# Patient Record
Sex: MALE | Race: WHITE | NOT HISPANIC OR LATINO | Employment: FULL TIME | URBAN - NONMETROPOLITAN AREA
[De-identification: names, ages, dates, MRNs, and addresses within clinical notes are randomized per-mention and may not be internally consistent; named-entity substitution may affect disease eponyms.]

---

## 2023-06-28 LAB
ALANINE AMINOTRANSFERASE (SGPT) (U/L) IN SER/PLAS: 41 U/L (ref 10–52)
ALBUMIN (G/DL) IN SER/PLAS: 4.2 G/DL (ref 3.4–5)
ALKALINE PHOSPHATASE (U/L) IN SER/PLAS: 69 U/L (ref 33–120)
ALPHA-1 FETOPROTEIN (NG/ML) IN SER/PLAS: <4 NG/ML (ref 0–9)
AMYLASE (U/L) IN SER/PLAS: 34 U/L (ref 29–103)
ANION GAP IN SER/PLAS: 11 MMOL/L (ref 10–20)
ASPARTATE AMINOTRANSFERASE (SGOT) (U/L) IN SER/PLAS: 27 U/L (ref 9–39)
BILIRUBIN TOTAL (MG/DL) IN SER/PLAS: 0.6 MG/DL (ref 0–1.2)
CALCIUM (MG/DL) IN SER/PLAS: 9 MG/DL (ref 8.6–10.3)
CANCER AG 19-9 (U/ML) IN SER/PLAS: <4 U/ML
CANCER AG 27-29 (U/ML) IN SER/PLAS: 27.7 U/ML (ref 0–38.6)
CARBON DIOXIDE, TOTAL (MMOL/L) IN SER/PLAS: 25 MMOL/L (ref 21–32)
CARCINOEMBRYONIC AG (NG/ML) IN SER/PLAS: <0.5 UG/L
CHLORIDE (MMOL/L) IN SER/PLAS: 108 MMOL/L (ref 98–107)
CHOLESTEROL (MG/DL) IN SER/PLAS: 170 MG/DL (ref 0–199)
CHOLESTEROL IN HDL (MG/DL) IN SER/PLAS: 45 MG/DL
CHOLESTEROL/HDL RATIO: 3.8
CREATININE (MG/DL) IN SER/PLAS: 0.88 MG/DL (ref 0.5–1.3)
ERYTHROCYTE DISTRIBUTION WIDTH (RATIO) BY AUTOMATED COUNT: 12.9 % (ref 11.5–14.5)
ERYTHROCYTE MEAN CORPUSCULAR HEMOGLOBIN CONCENTRATION (G/DL) BY AUTOMATED: 34.4 G/DL (ref 32–36)
ERYTHROCYTE MEAN CORPUSCULAR VOLUME (FL) BY AUTOMATED COUNT: 86 FL (ref 80–100)
ERYTHROCYTES (10*6/UL) IN BLOOD BY AUTOMATED COUNT: 4.92 X10E12/L (ref 4.5–5.9)
ESTIMATED AVERAGE GLUCOSE FOR HBA1C: 91 MG/DL
GFR MALE: >90 ML/MIN/1.73M2
GLUCOSE (MG/DL) IN SER/PLAS: 98 MG/DL (ref 74–99)
HEMATOCRIT (%) IN BLOOD BY AUTOMATED COUNT: 42.5 % (ref 41–52)
HEMOGLOBIN (G/DL) IN BLOOD: 14.6 G/DL (ref 13.5–17.5)
HEMOGLOBIN A1C/HEMOGLOBIN TOTAL IN BLOOD: 4.8 %
LDL: 109 MG/DL (ref 0–99)
LEUKOCYTES (10*3/UL) IN BLOOD BY AUTOMATED COUNT: 4.5 X10E9/L (ref 4.4–11.3)
LIPASE (U/L) IN SER/PLAS: 16 U/L (ref 9–82)
PLATELETS (10*3/UL) IN BLOOD AUTOMATED COUNT: 170 X10E9/L (ref 150–450)
POTASSIUM (MMOL/L) IN SER/PLAS: 3.8 MMOL/L (ref 3.5–5.3)
PROTEIN TOTAL: 7.1 G/DL (ref 6.4–8.2)
SODIUM (MMOL/L) IN SER/PLAS: 140 MMOL/L (ref 136–145)
TESTOSTERONE (NG/DL) IN SER/PLAS: 368 NG/DL (ref 240–1000)
THYROTROPIN (MIU/L) IN SER/PLAS BY DETECTION LIMIT <= 0.05 MIU/L: 1.48 MIU/L (ref 0.44–3.98)
TRIGLYCERIDE (MG/DL) IN SER/PLAS: 78 MG/DL (ref 0–149)
UREA NITROGEN (MG/DL) IN SER/PLAS: 12 MG/DL (ref 6–23)
VLDL: 16 MG/DL (ref 0–40)

## 2023-07-03 LAB
APPEARANCE, URINE: CLEAR
BILIRUBIN, URINE: NEGATIVE
BLOOD, URINE: NEGATIVE
COLOR, URINE: COLORLESS
GLUCOSE, URINE: NEGATIVE MG/DL
KETONES, URINE: NEGATIVE MG/DL
LEUKOCYTE ESTERASE, URINE: NEGATIVE
NITRITE, URINE: NEGATIVE
PH, URINE: 7 (ref 5–8)
PROTEIN, URINE: NEGATIVE MG/DL
SPECIFIC GRAVITY, URINE: 1 (ref 1–1.03)
UROBILINOGEN, URINE: <2 MG/DL (ref 0–1.9)

## 2023-07-18 ENCOUNTER — OFFICE VISIT (OUTPATIENT)
Dept: PRIMARY CARE | Facility: CLINIC | Age: 28
End: 2023-07-18
Payer: OTHER GOVERNMENT

## 2023-07-18 VITALS
HEART RATE: 67 BPM | HEIGHT: 72 IN | OXYGEN SATURATION: 96 % | WEIGHT: 237.9 LBS | DIASTOLIC BLOOD PRESSURE: 90 MMHG | SYSTOLIC BLOOD PRESSURE: 130 MMHG | BODY MASS INDEX: 32.22 KG/M2

## 2023-07-18 DIAGNOSIS — R06.83 SNORING: ICD-10-CM

## 2023-07-18 DIAGNOSIS — G47.30 OBSERVED SLEEP APNEA: ICD-10-CM

## 2023-07-18 DIAGNOSIS — Z76.89 ENCOUNTER TO ESTABLISH CARE: Primary | ICD-10-CM

## 2023-07-18 PROBLEM — K42.9 UMBILICAL HERNIA: Status: ACTIVE | Noted: 2020-10-22

## 2023-07-18 PROBLEM — F40.243 ANXIETY WITH FLYING: Status: ACTIVE | Noted: 2022-05-17

## 2023-07-18 PROCEDURE — 1036F TOBACCO NON-USER: CPT | Performed by: STUDENT IN AN ORGANIZED HEALTH CARE EDUCATION/TRAINING PROGRAM

## 2023-07-18 PROCEDURE — 99204 OFFICE O/P NEW MOD 45 MIN: CPT | Performed by: STUDENT IN AN ORGANIZED HEALTH CARE EDUCATION/TRAINING PROGRAM

## 2023-07-18 RX ORDER — MULTIVITAMIN
1 TABLET ORAL
COMMUNITY

## 2023-07-18 ASSESSMENT — PATIENT HEALTH QUESTIONNAIRE - PHQ9
1. LITTLE INTEREST OR PLEASURE IN DOING THINGS: NOT AT ALL
SUM OF ALL RESPONSES TO PHQ9 QUESTIONS 1 AND 2: 0
2. FEELING DOWN, DEPRESSED OR HOPELESS: NOT AT ALL

## 2023-07-18 NOTE — PROGRESS NOTES
Subjective   Patient ID: Jonatan Horn is a 28 y.o. male who presents for New PT  (Would like to establish care as well as get a sleep study done. Never had a sleep study done, wife is concerned about breathing during sleep. Denies trouble falling asleep, but does wake up a lot through out the night. ).    HPI    Here to establish care and has sleep concerns.     Umbilical hernia operated about 18 months ago. Not bothering him at this time.     Works for the fire department.     Sleep Medicine Consultation Note:    Snoring: yes  Severity: loud  Frequency: daily  Duration: ~10 years   Over time: worsening   Observed Apneas: yes  Mouth Breathing at night: yes  Dry Mouth in morning: yes  Nocturnal Gasping: yes  Nasal Obstruction: no  Weight: gained 25 lbs over the last three years.     Sleep Pattern:  Bed/Recliner/Wedge: flat bed.   # of pillows under head: 1-2  Position: all sides.  Bedtime: 11:30 pm  Latency: short latency  Awakenings: 2-3 times  Reason: trouble breathing  Wake time: 9- 9:30 am  Patients estimate of total sleep time: 9 to 9.5 hours     Questionnaires:  Patient-reported scores:    Daytime Symptoms:  Upon Awakening: groggy  Daytime fatigue/sleepiness: yes  Naps: yes - 1 hour 4-5 times a week  Involuntary Dozing: sometimes   Driving: Difficulty with sleepiness and driving: on long   Close calls related to sleepiness: no  Accidents related to sleepiness: no    Sleep Review of Symptoms:  Parasomnias:  Sleep Walking: no  Dream Enactment: no  Bruxism: not sure    Motor:  RLS: no  PLMS: no    Narcolepsy:  Hallucinations: no  Paralysis: no  Cataplexy: not discussed    Past/Childhood Sleep History: none     Family History: Family history of sleep disorders: mother- sleepwalks.     Social History:  Employment: works in fire department.   Alcohol: occasional   Smoking: never  Other drugs: no  Caffeine: 3-4 cups of coffee daily  Family: lives with family - 2 dogs. One dog sleeps in bed.     ROS:  CON: weight  "change: see HPI  ENT: nasal obstruction: see HPI  NEURO: sleep related headaches: sometimes - but wonders if this is from neck pain.  GI: GERD: once in a while  : Nocturia: sometimes.  ALL: Environmental Allergies: lately yes    PE:  General: calm and cooperative  Eyes: Conjunctival injection: no  EOM: intact  Eyelid hooding: no    ENT: MP: 2/4  Facial deformity: mild retrognathia.   Hard palate: normal  Soft palate: mild uvular inflammation  Gums and teeth: normal  Tongue: lateral scalloping.   Nares: turbinate inflammation  Pul: Respirations: even, non labored  Auscultation: CTA bilaterally  Neck/Lymphatics: Lymphadenopathy: no  Masses: no  Circumference: 17 inches  CV: RRR, no murmurs, no lower extremity swelling  Neuro: no tics or tremors noted. Gait stable  Psych  Alert and appropriate: yes  Mood: \"good\"  Affect: mood congruent    Assessment: Mr. Horn who is seen to evaluate for possible obstructive sleep apnea. The pathophysiology of, the reasons to treat and treatment options for obstructive sleep apnea were all reviewed with the patient today. Has risk factors (neck circumference >16; BMI 32 and reported symptoms). Will try HSAT. Understands that he may need in-lab if this comes back negative.     Recommendations:  1) Polysomnography - HSAT.  2) Driving safety was reviewed with patient. If the patient feels too sleepy to drive he/she knows not to drive. If he/she becomes sleepy while driving he/she will pull over and nap.    The patient indicates understanding of these issues and agrees with the plan.    Follow up in 4-6 weeks.     Nicko Andrade MD MPH  "

## 2023-07-27 DIAGNOSIS — G47.30 OBSERVED SLEEP APNEA: ICD-10-CM

## 2023-07-27 DIAGNOSIS — R06.83 SNORING: ICD-10-CM

## 2023-08-21 ENCOUNTER — OFFICE VISIT (OUTPATIENT)
Dept: PRIMARY CARE | Facility: CLINIC | Age: 28
End: 2023-08-21
Payer: OTHER GOVERNMENT

## 2023-08-21 VITALS
WEIGHT: 239 LBS | OXYGEN SATURATION: 95 % | BODY MASS INDEX: 32.37 KG/M2 | HEART RATE: 70 BPM | SYSTOLIC BLOOD PRESSURE: 128 MMHG | DIASTOLIC BLOOD PRESSURE: 88 MMHG

## 2023-08-21 DIAGNOSIS — G47.30 OBSERVED SLEEP APNEA: Primary | ICD-10-CM

## 2023-08-21 DIAGNOSIS — R06.83 SNORING: ICD-10-CM

## 2023-08-21 PROCEDURE — 99213 OFFICE O/P EST LOW 20 MIN: CPT | Performed by: STUDENT IN AN ORGANIZED HEALTH CARE EDUCATION/TRAINING PROGRAM

## 2023-08-21 PROCEDURE — 1036F TOBACCO NON-USER: CPT | Performed by: STUDENT IN AN ORGANIZED HEALTH CARE EDUCATION/TRAINING PROGRAM

## 2023-08-21 NOTE — PROGRESS NOTES
"Subjective   Patient ID: Jonatan Horn is a 28 y.o. male who presents for Follow-up (PT is here for 4-6 week. PT had a sleep study done and would like to go over results of that. ).    HPI    Patient is here to discuss HSAT test results.   Reports that he continues to have symptoms of daytime sleepiness. His wife is also significantly concerned about his KERVIN as she notices apneic events.     \"Sleep Medicine Consultation Note:     Snoring: yes  Severity: loud  Frequency: daily  Duration: ~10 years   Over time: worsening   Observed Apneas: yes  Mouth Breathing at night: yes  Dry Mouth in morning: yes  Nocturnal Gasping: yes  Nasal Obstruction: no  Weight: gained 25 lbs over the last three years.      Sleep Pattern:  Bed/Recliner/Wedge: flat bed.   # of pillows under head: 1-2  Position: all sides.  Bedtime: 11:30 pm  Latency: short latency  Awakenings: 2-3 times  Reason: trouble breathing  Wake time: 9- 9:30 am  Patients estimate of total sleep time: 9 to 9.5 hours      Questionnaires:  Patient-reported scores:     Daytime Symptoms:  Upon Awakening: groggy  Daytime fatigue/sleepiness: yes  Naps: yes - 1 hour 4-5 times a week  Involuntary Dozing: sometimes   Driving: Difficulty with sleepiness and driving: on long   Close calls related to sleepiness: no  Accidents related to sleepiness: no     Sleep Review of Symptoms:  Parasomnias:  Sleep Walking: no  Dream Enactment: no  Bruxism: not sure     Motor:  RLS: no  PLMS: no     Narcolepsy:  Hallucinations: no  Paralysis: no  Cataplexy: not discussed     Past/Childhood Sleep History: none      Family History: Family history of sleep disorders: mother- sleepwalks.      Social History:  Employment: works in fire department.   Alcohol: occasional   Smoking: never  Other drugs: no  Caffeine: 3-4 cups of coffee daily  Family: lives with family - 2 dogs. One dog sleeps in bed.      ROS:  CON: weight change: see HPI  ENT: nasal obstruction: see HPI  NEURO: sleep related " "headaches: sometimes - but wonders if this is from neck pain.  GI: GERD: once in a while  : Nocturia: sometimes.  ALL: Environmental Allergies: lately yes     PE:  General: calm and cooperative  Eyes: Conjunctival injection: no  EOM: intact  Eyelid hooding: no     ENT: MP: 2/4  Facial deformity: mild retrognathia.   Hard palate: normal  Soft palate: mild uvular inflammation  Gums and teeth: normal  Tongue: lateral scalloping.   Nares: turbinate inflammation  Pul: Respirations: even, non labored  Auscultation: CTA bilaterally  Neck/Lymphatics: Lymphadenopathy: no  Masses: no  Circumference: 17 inches  CV: RRR, no murmurs, no lower extremity swelling  Neuro: no tics or tremors noted. Gait stable  Psych  Alert and appropriate: yes  Mood: \"good\"  Affect: mood congruent     Assessment: Mr. Horn who is seen to evaluate for possible obstructive sleep apnea. The pathophysiology of, the reasons to treat and treatment options for obstructive sleep apnea were all reviewed with the patient today. Has risk factors (neck circumference >16; BMI 32 and reported symptoms). Will try HSAT. Understands that he may need in-lab if this comes back negative. \"      Review of Systems  ROS negative except discussed above in HPI.    Vitals:    08/21/23 0911   BP: 128/88   Pulse: 70   SpO2: 95%     Objective   Physical Exam  Constitutional:       Appearance: Normal appearance.   Cardiovascular:      Rate and Rhythm: Normal rate and regular rhythm.   Pulmonary:      Effort: Pulmonary effort is normal.      Breath sounds: Normal breath sounds.   Neurological:      Mental Status: He is alert.       Assessment/Plan   Jonatan was seen today for follow-up.    Discussed results with patient. HSAT did not show evidence of KERVIN based on ovarall RDI. However, supine RDI was high. Patient reports significant symptoms associated with KERVIN(see above). An in-lab PSG is ordered for further evaluation as HSAT can miss the diagnosis of KERVIN due to its " potential to miss diagnosis based on arousal criteria as it does not monitor for eeg. Patient wants to get further evaluated given his symptoms. Ordered in-lab PSG..    Diagnoses and all orders for this visit:  Observed sleep apnea (Primary)  -     In-Center Sleep Study (Non-Sleep Provider Only)  Snoring  -     In-Center Sleep Study (Non-Sleep Provider Only)      Follow up in 6-8 weeks.     Nicko Andrade MD MPH

## 2023-10-02 ENCOUNTER — APPOINTMENT (OUTPATIENT)
Dept: PRIMARY CARE | Facility: CLINIC | Age: 28
End: 2023-10-02
Payer: OTHER GOVERNMENT

## 2023-10-14 PROBLEM — N50.819 TESTICLE TENDERNESS: Status: ACTIVE | Noted: 2023-10-14

## 2023-10-14 PROBLEM — E66.811 CLASS 1 OBESITY WITH ALVEOLAR HYPOVENTILATION AND BODY MASS INDEX (BMI) OF 32.0 TO 32.9 IN ADULT: Status: ACTIVE | Noted: 2023-10-14

## 2023-10-14 PROBLEM — E66.9 CLASS 1 OBESITY WITH BODY MASS INDEX (BMI) OF 32.0 TO 32.9 IN ADULT: Status: ACTIVE | Noted: 2023-10-14

## 2023-10-14 PROBLEM — E66.2 CLASS 1 OBESITY WITH ALVEOLAR HYPOVENTILATION AND BODY MASS INDEX (BMI) OF 32.0 TO 32.9 IN ADULT (MULTI): Status: ACTIVE | Noted: 2023-10-14

## 2023-10-14 PROBLEM — E66.811 CLASS 1 OBESITY WITH ALVEOLAR HYPOVENTILATION AND BODY MASS INDEX (BMI) OF 32.0 TO 32.9 IN ADULT: Status: RESOLVED | Noted: 2023-10-14 | Resolved: 2023-10-14

## 2023-10-14 PROBLEM — E66.2 CLASS 1 OBESITY WITH ALVEOLAR HYPOVENTILATION AND BODY MASS INDEX (BMI) OF 32.0 TO 32.9 IN ADULT (MULTI): Status: RESOLVED | Noted: 2023-10-14 | Resolved: 2023-10-14

## 2023-10-14 PROBLEM — M25.562 LEFT KNEE PAIN: Status: ACTIVE | Noted: 2023-10-14

## 2023-10-14 RX ORDER — FLURBIPROFEN 100 MG/1
100 TABLET, FILM COATED ORAL 2 TIMES DAILY
COMMUNITY

## 2023-10-14 RX ORDER — HYDROXYZINE HYDROCHLORIDE 10 MG/1
1 TABLET, FILM COATED ORAL AS NEEDED
COMMUNITY

## 2023-10-16 ENCOUNTER — CLINICAL SUPPORT (OUTPATIENT)
Dept: SLEEP MEDICINE | Facility: CLINIC | Age: 28
End: 2023-10-16
Payer: OTHER GOVERNMENT

## 2023-10-16 ENCOUNTER — TELEPHONE (OUTPATIENT)
Dept: SLEEP MEDICINE | Facility: CLINIC | Age: 28
End: 2023-10-16
Payer: OTHER GOVERNMENT

## 2023-10-16 DIAGNOSIS — G47.30 OBSERVED SLEEP APNEA: ICD-10-CM

## 2023-10-16 DIAGNOSIS — R06.83 SNORING: ICD-10-CM

## 2023-10-16 DIAGNOSIS — G47.33 OBSTRUCTIVE SLEEP APNEA (ADULT) (PEDIATRIC): ICD-10-CM

## 2023-10-16 PROCEDURE — 95810 POLYSOM 6/> YRS 4/> PARAM: CPT | Performed by: STUDENT IN AN ORGANIZED HEALTH CARE EDUCATION/TRAINING PROGRAM

## 2023-10-16 ASSESSMENT — SLEEP AND FATIGUE QUESTIONNAIRES: ESS TOTAL SCORE: 10

## 2023-10-17 VITALS
HEART RATE: 60 BPM | WEIGHT: 239 LBS | TEMPERATURE: 98 F | SYSTOLIC BLOOD PRESSURE: 139 MMHG | DIASTOLIC BLOOD PRESSURE: 94 MMHG | BODY MASS INDEX: 32.37 KG/M2 | RESPIRATION RATE: 16 BRPM | HEIGHT: 72 IN

## 2023-10-17 ASSESSMENT — SLEEP AND FATIGUE QUESTIONNAIRES
HOW LIKELY ARE YOU TO NOD OFF OR FALL ASLEEP WHILE LYING DOWN TO REST IN THE AFTERNOON WHEN CIRCUMSTANCES PERMIT: HIGH CHANCE OF DOZING
SITING INACTIVE IN A PUBLIC PLACE LIKE A CLASS ROOM OR A MOVIE THEATER: MODERATE CHANCE OF DOZING
HOW LIKELY ARE YOU TO NOD OFF OR FALL ASLEEP WHILE SITTING QUIETLY AFTER LUNCH WITHOUT ALCOHOL: SLIGHT CHANCE OF DOZING
HOW LIKELY ARE YOU TO NOD OFF OR FALL ASLEEP IN A CAR, WHILE STOPPED FOR A FEW MINUTES IN TRAFFIC: WOULD NEVER DOZE
HOW LIKELY ARE YOU TO NOD OFF OR FALL ASLEEP WHILE SITTING AND TALKING TO SOMEONE: WOULD NEVER DOZE
ESS-CHAD TOTAL SCORE: 10
HOW LIKELY ARE YOU TO NOD OFF OR FALL ASLEEP WHILE SITTING AND READING: MODERATE CHANCE OF DOZING
HOW LIKELY ARE YOU TO NOD OFF OR FALL ASLEEP WHILE WATCHING TV: SLIGHT CHANCE OF DOZING
HOW LIKELY ARE YOU TO NOD OFF OR FALL ASLEEP WHEN YOU ARE A PASSENGER IN A CAR FOR AN HOUR WITHOUT A BREAK: SLIGHT CHANCE OF DOZING

## 2023-10-17 NOTE — PROGRESS NOTES
[facility] TECH NOTE:     Patient: Jonatan Horn   MRN//AGE: [id number]  1995  28 y.o.   Technologist: FRANCISCO Perkins    Room: 3   Service Date: 10/17/2023        Sleep Testing Location: [place] [address]     [place]: 10    TECHNOLOGIST SLEEP STUDY PROCEDURE NOTE:   This sleep study is being conducted according to the policies and procedures outlined by the AAS accreditation standards.  The sleep study procedure and processes involved during this appointment was explained to the patient. All questions were answered. The patient verbalized understanding.      The patient is a 28 y.o. year old male scheduled for a PSG  with montage of:  PSG . he arrived for his appointment as scheduled.     The study that was ultimately completed was a PSG  with montage of:  PSG .    The full study Was completed.  Patient questionnaires completed?: yes     Consents signed? yes    Initial Fall Risk Screening:     Jonatan has not fallen in the last 6 months. Jonatan does not have a fear of falling. He does not need assistance with sitting, standing, or walking. he does not need assistance walking in his home. he does not need assistance in an unfamiliar setting. The patient is notusing an assistive device.     Brief Study observations: Mr Horn arrived as scheduled for his polysomnogram. He was escorted to room 3 where consent was signed and all questions were answered. During this study, he experienced occasional obstructive respiratory events (mostly Hypopneas) with associated arousals. Snoring was frequent and loud.     Deviation to order/protocol and reason: None          After the procedure, the patient was reminded to followup with ordering clinician for testing results.      Technologist: FRANCISCO Perkins

## 2023-11-27 ENCOUNTER — OFFICE VISIT (OUTPATIENT)
Dept: PRIMARY CARE | Facility: CLINIC | Age: 28
End: 2023-11-27
Payer: OTHER GOVERNMENT

## 2023-11-27 ENCOUNTER — LAB (OUTPATIENT)
Dept: LAB | Facility: LAB | Age: 28
End: 2023-11-27
Payer: OTHER GOVERNMENT

## 2023-11-27 VITALS
SYSTOLIC BLOOD PRESSURE: 136 MMHG | HEART RATE: 76 BPM | WEIGHT: 246.7 LBS | BODY MASS INDEX: 33.41 KG/M2 | DIASTOLIC BLOOD PRESSURE: 90 MMHG | OXYGEN SATURATION: 97 %

## 2023-11-27 DIAGNOSIS — G25.81 RESTLESS LEG SYNDROME: ICD-10-CM

## 2023-11-27 DIAGNOSIS — G47.30 OBSERVED SLEEP APNEA: Primary | ICD-10-CM

## 2023-11-27 LAB — FERRITIN SERPL-MCNC: 79 NG/ML (ref 20–300)

## 2023-11-27 PROCEDURE — 82728 ASSAY OF FERRITIN: CPT

## 2023-11-27 PROCEDURE — 99213 OFFICE O/P EST LOW 20 MIN: CPT | Performed by: STUDENT IN AN ORGANIZED HEALTH CARE EDUCATION/TRAINING PROGRAM

## 2023-11-27 PROCEDURE — 1036F TOBACCO NON-USER: CPT | Performed by: STUDENT IN AN ORGANIZED HEALTH CARE EDUCATION/TRAINING PROGRAM

## 2023-11-27 PROCEDURE — 36415 COLL VENOUS BLD VENIPUNCTURE: CPT

## 2023-11-27 ASSESSMENT — PATIENT HEALTH QUESTIONNAIRE - PHQ9
1. LITTLE INTEREST OR PLEASURE IN DOING THINGS: NOT AT ALL
2. FEELING DOWN, DEPRESSED OR HOPELESS: NOT AT ALL
SUM OF ALL RESPONSES TO PHQ9 QUESTIONS 1 AND 2: 0

## 2023-11-27 NOTE — PROGRESS NOTES
Subjective   Patient ID: Jonatan Horn is a 28 y.o. male who presents for Follow-up.    HPI    Patient is here with his wife to go over sleep study results.  Denies trouble falling asleep. Reports he does wake up but can go back to sleep pretty easy. Reports he does snore. Reports he sometimes wakes up gasping and feeling like he can't breathe.    Review of Systems  ROS negative except discussed above in HPI.    Vitals:    11/27/23 1323   BP: 136/90   Pulse: 76   SpO2: 97%     Objective   Physical Exam  Constitutional:       Appearance: Normal appearance.   Neurological:      Mental Status: He is alert.           Assessment/Plan   Jonatan was seen today for follow-up.  Diagnoses and all orders for this visit:  Observed sleep apnea (Primary)  -     Positive Airway Pressure (PAP) Therapy  Restless leg syndrome  -     Ferritin; Future    Discussed sleep study results which showed mild KERVIN. But given the patient's daytime sleepiness and snoring, will start CPAP.     Few PLMs noticed -- so will check ferritin levels.     Follow up in 6-8 weeks.          Nicko Andrade MD MPH

## 2024-02-28 ENCOUNTER — APPOINTMENT (OUTPATIENT)
Dept: PRIMARY CARE | Facility: CLINIC | Age: 29
End: 2024-02-28

## 2024-09-24 ENCOUNTER — LAB (OUTPATIENT)
Dept: LAB | Facility: LAB | Age: 29
End: 2024-09-24

## 2024-09-24 ENCOUNTER — HOSPITAL ENCOUNTER (OUTPATIENT)
Dept: RADIOLOGY | Facility: HOSPITAL | Age: 29
Discharge: HOME | End: 2024-09-24

## 2024-09-24 DIAGNOSIS — Z00.01 ENCOUNTER FOR GENERAL ADULT MEDICAL EXAMINATION WITH ABNORMAL FINDINGS: ICD-10-CM

## 2024-09-24 DIAGNOSIS — Z00.01 ENCOUNTER FOR GENERAL ADULT MEDICAL EXAMINATION WITH ABNORMAL FINDINGS: Primary | ICD-10-CM

## 2024-09-24 LAB
ALBUMIN SERPL BCP-MCNC: 4.5 G/DL (ref 3.4–5)
ALP SERPL-CCNC: 71 U/L (ref 33–120)
ALT SERPL W P-5'-P-CCNC: 31 U/L (ref 10–52)
ANION GAP SERPL CALC-SCNC: 12 MMOL/L (ref 10–20)
AST SERPL W P-5'-P-CCNC: 20 U/L (ref 9–39)
BASOPHILS # BLD AUTO: 0.01 X10*3/UL (ref 0–0.1)
BASOPHILS NFR BLD AUTO: 0.2 %
BILIRUB SERPL-MCNC: 0.5 MG/DL (ref 0–1.2)
BUN SERPL-MCNC: 13 MG/DL (ref 6–23)
CALCIUM SERPL-MCNC: 9 MG/DL (ref 8.6–10.3)
CHLORIDE SERPL-SCNC: 105 MMOL/L (ref 98–107)
CHOLEST SERPL-MCNC: 175 MG/DL (ref 0–199)
CHOLESTEROL/HDL RATIO: 3.6
CO2 SERPL-SCNC: 25 MMOL/L (ref 21–32)
CREAT SERPL-MCNC: 0.87 MG/DL (ref 0.5–1.3)
EGFRCR SERPLBLD CKD-EPI 2021: >90 ML/MIN/1.73M*2
EOSINOPHIL # BLD AUTO: 0.08 X10*3/UL (ref 0–0.7)
EOSINOPHIL NFR BLD AUTO: 1.9 %
ERYTHROCYTE [DISTWIDTH] IN BLOOD BY AUTOMATED COUNT: 12.5 % (ref 11.5–14.5)
EST. AVERAGE GLUCOSE BLD GHB EST-MCNC: 85 MG/DL
GLUCOSE SERPL-MCNC: 93 MG/DL (ref 74–99)
HBA1C MFR BLD: 4.6 %
HCT VFR BLD AUTO: 41.5 % (ref 41–52)
HDLC SERPL-MCNC: 49 MG/DL
HGB BLD-MCNC: 14.6 G/DL (ref 13.5–17.5)
IMM GRANULOCYTES # BLD AUTO: 0.01 X10*3/UL (ref 0–0.7)
IMM GRANULOCYTES NFR BLD AUTO: 0.2 % (ref 0–0.9)
LDLC SERPL CALC-MCNC: 107 MG/DL
LYMPHOCYTES # BLD AUTO: 1.59 X10*3/UL (ref 1.2–4.8)
LYMPHOCYTES NFR BLD AUTO: 37.1 %
MCH RBC QN AUTO: 30.2 PG (ref 26–34)
MCHC RBC AUTO-ENTMCNC: 35.2 G/DL (ref 32–36)
MCV RBC AUTO: 86 FL (ref 80–100)
MONOCYTES # BLD AUTO: 0.34 X10*3/UL (ref 0.1–1)
MONOCYTES NFR BLD AUTO: 7.9 %
NEUTROPHILS # BLD AUTO: 2.25 X10*3/UL (ref 1.2–7.7)
NEUTROPHILS NFR BLD AUTO: 52.7 %
NON HDL CHOLESTEROL: 126 MG/DL (ref 0–149)
NRBC BLD-RTO: 0 /100 WBCS (ref 0–0)
PLATELET # BLD AUTO: 165 X10*3/UL (ref 150–450)
POTASSIUM SERPL-SCNC: 4 MMOL/L (ref 3.5–5.3)
PROT SERPL-MCNC: 7 G/DL (ref 6.4–8.2)
RBC # BLD AUTO: 4.84 X10*6/UL (ref 4.5–5.9)
SODIUM SERPL-SCNC: 138 MMOL/L (ref 136–145)
TRIGL SERPL-MCNC: 94 MG/DL (ref 0–149)
VLDL: 19 MG/DL (ref 0–40)
WBC # BLD AUTO: 4.3 X10*3/UL (ref 4.4–11.3)

## 2024-09-24 PROCEDURE — 71046 X-RAY EXAM CHEST 2 VIEWS: CPT | Performed by: RADIOLOGY

## 2024-09-24 PROCEDURE — 85025 COMPLETE CBC W/AUTO DIFF WBC: CPT

## 2024-09-24 PROCEDURE — 83036 HEMOGLOBIN GLYCOSYLATED A1C: CPT

## 2024-09-24 PROCEDURE — 80061 LIPID PANEL: CPT

## 2024-09-24 PROCEDURE — 80053 COMPREHEN METABOLIC PANEL: CPT

## 2024-09-24 PROCEDURE — 71046 X-RAY EXAM CHEST 2 VIEWS: CPT

## 2024-10-15 ENCOUNTER — HOSPITAL ENCOUNTER (OUTPATIENT)
Dept: CARDIOLOGY | Facility: HOSPITAL | Age: 29
Discharge: HOME | End: 2024-10-15

## 2024-10-15 DIAGNOSIS — Z00.01 ENCOUNTER FOR GENERAL ADULT MEDICAL EXAMINATION WITH ABNORMAL FINDINGS: ICD-10-CM

## 2024-10-15 LAB
ATRIAL RATE: 68 BPM
P AXIS: 13 DEGREES
P OFFSET: 180 MS
P ONSET: 119 MS
PR INTERVAL: 194 MS
Q ONSET: 216 MS
QRS COUNT: 11 BEATS
QRS DURATION: 108 MS
QT INTERVAL: 386 MS
QTC CALCULATION(BAZETT): 410 MS
QTC FREDERICIA: 402 MS
R AXIS: 40 DEGREES
T AXIS: 21 DEGREES
T OFFSET: 409 MS
VENTRICULAR RATE: 68 BPM

## 2024-10-15 PROCEDURE — 93005 ELECTROCARDIOGRAM TRACING: CPT

## 2025-02-13 ENCOUNTER — APPOINTMENT (OUTPATIENT)
Dept: PRIMARY CARE | Facility: CLINIC | Age: 30
End: 2025-02-13

## 2025-02-13 VITALS
DIASTOLIC BLOOD PRESSURE: 88 MMHG | OXYGEN SATURATION: 96 % | HEIGHT: 72 IN | HEART RATE: 81 BPM | BODY MASS INDEX: 33.72 KG/M2 | SYSTOLIC BLOOD PRESSURE: 138 MMHG | WEIGHT: 249 LBS

## 2025-02-13 DIAGNOSIS — M25.562 CHRONIC PAIN OF LEFT KNEE: Primary | ICD-10-CM

## 2025-02-13 DIAGNOSIS — G89.29 CHRONIC PAIN OF LEFT KNEE: Primary | ICD-10-CM

## 2025-02-13 DIAGNOSIS — N46.9 MALE INFERTILITY, UNSPECIFIED: ICD-10-CM

## 2025-02-13 DIAGNOSIS — Z00.00 ROUTINE GENERAL MEDICAL EXAMINATION AT A HEALTH CARE FACILITY: ICD-10-CM

## 2025-02-13 DIAGNOSIS — M54.2 NECK PAIN: ICD-10-CM

## 2025-02-13 ASSESSMENT — ENCOUNTER SYMPTOMS
FATIGUE: 0
POLYDIPSIA: 0
TROUBLE SWALLOWING: 0
CONSTIPATION: 0
POLYPHAGIA: 0
DIARRHEA: 0
RECTAL PAIN: 0
ABDOMINAL PAIN: 0
HEADACHES: 0
DIAPHORESIS: 0
WEAKNESS: 0
WOUND: 0
NECK STIFFNESS: 1
NERVOUS/ANXIOUS: 0
PALPITATIONS: 0
NECK PAIN: 1
CHILLS: 0
ARTHRALGIAS: 1
FREQUENCY: 0
SHORTNESS OF BREATH: 0
DIFFICULTY URINATING: 0
UNEXPECTED WEIGHT CHANGE: 0
MYALGIAS: 0
CHEST TIGHTNESS: 0
NUMBNESS: 0
NAUSEA: 0

## 2025-02-13 ASSESSMENT — PATIENT HEALTH QUESTIONNAIRE - PHQ9
SUM OF ALL RESPONSES TO PHQ9 QUESTIONS 1 AND 2: 0
2. FEELING DOWN, DEPRESSED OR HOPELESS: NOT AT ALL
1. LITTLE INTEREST OR PLEASURE IN DOING THINGS: NOT AT ALL

## 2025-02-13 NOTE — PROGRESS NOTES
Subjective   Patient ID: Jonatan Horn is a 29 y.o. male who presents for [facility] Care (Pt is here today to establish care as old PCP left, also reports neck pain and some other things he would like to talk about. ).    Patient presents to establish primary care.  Was previously by Dr. Watts in this office.    Routine physical performed by city, labs drawn by city and on record from September.    Left knee pain: Has had a reduction of function of the left knee over the last year, previously had an MRI performed 6 months ago that showed no ligament damage, no Baker's cyst, no effusion.  Patient has no pain on exam.  States the knee will give out on him and at times.  Discussed physical therapy with patient which she is open to.  Will perform 6 weeks of physical therapy and follow-up for improvement.    Reproductive issues: Patient wife been trying to conceive a child for 2-1/2 years, his wife is recently checked by OB/GYN and found to have no reproductive issues.  He would like to be referred for evaluation.  Will refer to Dr. Rg -urology.    Cervical strain: Pain to right cervical neck, has been treated by chiropractic.  Negative Spurling sign, no radiation.  Pain is persistent and patient would like further evaluation before any other treatment.  Will get x-ray cervical spine for evaluation.  Denies injury.         Review of Systems   Constitutional:  Negative for chills, diaphoresis, fatigue and unexpected weight change.   HENT:  Negative for dental problem, tinnitus and trouble swallowing.    Eyes:  Negative for visual disturbance.   Respiratory:  Negative for chest tightness and shortness of breath.    Cardiovascular:  Negative for chest pain, palpitations and leg swelling.   Gastrointestinal:  Negative for abdominal pain, constipation, diarrhea, nausea and rectal pain.   Endocrine: Negative for polydipsia, polyphagia and polyuria.   Genitourinary:  Negative for difficulty urinating, frequency and  "urgency.   Musculoskeletal:  Positive for arthralgias (left Knee), neck pain and neck stiffness. Negative for myalgias.   Skin:  Negative for pallor and wound.   Neurological:  Negative for syncope, weakness, numbness and headaches.   Psychiatric/Behavioral:  Negative for self-injury and suicidal ideas. The patient is not nervous/anxious.        Objective   /88   Pulse 81   Ht 1.83 m (6' 0.05\")   Wt 113 kg (249 lb)   SpO2 96%   BMI 33.72 kg/m²     Physical Exam  Vitals and nursing note reviewed.   Constitutional:       General: He is not in acute distress.     Appearance: Normal appearance. He is normal weight.   HENT:      Head: Normocephalic.      Nose: Nose normal. No congestion or rhinorrhea.      Mouth/Throat:      Mouth: Mucous membranes are moist.      Pharynx: Oropharynx is clear.   Eyes:      General: No scleral icterus.     Pupils: Pupils are equal, round, and reactive to light.   Neck:      Vascular: No carotid bruit.   Cardiovascular:      Rate and Rhythm: Normal rate and regular rhythm.      Pulses: Normal pulses.      Heart sounds: Normal heart sounds. No murmur heard.  Pulmonary:      Effort: Pulmonary effort is normal. No respiratory distress.      Breath sounds: Normal breath sounds. No stridor. No wheezing, rhonchi or rales.   Abdominal:      General: Bowel sounds are normal. There is no distension.      Palpations: Abdomen is soft.   Musculoskeletal:         General: No swelling, tenderness, deformity or signs of injury. Normal range of motion.      Cervical back: Normal range of motion. Rigidity present.   Skin:     General: Skin is warm and dry.      Capillary Refill: Capillary refill takes less than 2 seconds.      Coloration: Skin is not jaundiced.   Neurological:      General: No focal deficit present.      Mental Status: He is alert and oriented to person, place, and time. Mental status is at baseline.   Psychiatric:         Mood and Affect: Mood normal.         Behavior: Behavior " normal.         Thought Content: Thought content normal.         Judgment: Judgment normal.         Assessment/Plan   Diagnoses and all orders for this visit:  Chronic pain of left knee  -     Referral to Physical Therapy; Future  Neck pain  -     XR cervical spine 2-3 views; Future  Male infertility, unspecified  -     Referral to Urology; Future  Routine general medical examination at a health care facility

## 2025-02-14 ENCOUNTER — HOSPITAL ENCOUNTER (OUTPATIENT)
Dept: RADIOLOGY | Facility: CLINIC | Age: 30
Discharge: HOME | End: 2025-02-14
Payer: OTHER GOVERNMENT

## 2025-02-14 DIAGNOSIS — M54.2 NECK PAIN: ICD-10-CM

## 2025-02-14 PROBLEM — N46.9 MALE INFERTILITY, UNSPECIFIED: Status: ACTIVE | Noted: 2025-02-14

## 2025-02-14 PROCEDURE — 72040 X-RAY EXAM NECK SPINE 2-3 VW: CPT

## 2025-02-19 ENCOUNTER — EVALUATION (OUTPATIENT)
Dept: PHYSICAL THERAPY | Facility: CLINIC | Age: 30
End: 2025-02-19
Payer: OTHER GOVERNMENT

## 2025-02-19 DIAGNOSIS — G89.29 CHRONIC PAIN OF LEFT KNEE: ICD-10-CM

## 2025-02-19 DIAGNOSIS — M25.562 CHRONIC PAIN OF LEFT KNEE: ICD-10-CM

## 2025-02-19 PROCEDURE — 97161 PT EVAL LOW COMPLEX 20 MIN: CPT | Mod: GP

## 2025-02-19 PROCEDURE — 97110 THERAPEUTIC EXERCISES: CPT | Mod: GP

## 2025-02-19 ASSESSMENT — ENCOUNTER SYMPTOMS
OCCASIONAL FEELINGS OF UNSTEADINESS: 0
DEPRESSION: 0
LOSS OF SENSATION IN FEET: 0

## 2025-02-19 ASSESSMENT — PAIN - FUNCTIONAL ASSESSMENT: PAIN_FUNCTIONAL_ASSESSMENT: 0-10

## 2025-02-19 ASSESSMENT — PAIN SCALES - GENERAL: PAINLEVEL_OUTOF10: 4

## 2025-02-19 ASSESSMENT — ACTIVITIES OF DAILY LIVING (ADL): EFFECT OF PAIN ON DAILY ACTIVITIES: SEE GOALS

## 2025-02-19 NOTE — PROGRESS NOTES
Patient Name: Jonatan Horn  MRN: [id number]  Today's Date: 2025  : 1995  Omar Montana  Time Calculation  Start Time: 842  Stop Time: 915  Time Calculation (min): 33 min    PT Evaluation Time Entry  PT Evaluation (Low) Time Entry: 25   PT Therapeutic Procedures Time Entry  Therapeutic Exercise Time Entry: 8                         Assessment:  Rehab Prognosis: Fair (chronic syndrome)  Barriers to Participation:  (none)    Plan:  Treatment/Interventions: Aquatic therapy, Cryotherapy, Dry needling, Education/ Instruction, Electrical stimulation, Gait training, Hot pack, Manual therapy, Self care/ home management, Therapeutic exercises (IASTM/cupping)  PT Plan: Skilled PT  PT Frequency: 2 times per week  Duration: 4wks  Plan of Care Agreement: Patient    Current Problem:   1. Chronic pain of left knee  Referral to Physical Therapy    Follow Up In Physical Therapy          Subjective    General:  General  Reason for Referral: Chronic L knee pain  Referred By: Omar Montana  General Comment:   C/C sx*/Max sx level*:  BAILEY/DOI/Work*?:  noted pain and swelling at the knee after a run (jump and land) a few years ago)  ADL makes worse*?:Wbing; stairs and ladders  ADL makes better?: NWBing  PLOF:Unlimited job/home tasks performed- YES:   (see goals)  Testing*: 6/10/24 MR shows trochlear area degen    Physical Findings*: Limited:                                                  Strength ( L knee )                                         POC:  Ongoing clinic PT to include: continue with open to closed chain LLE PRE as jean-claude;  the patient does have access to bike  and free weight gym equip (will tailor ongoing HEP to available equip);  he was advised to not emphasize running primarily due to long term degenerative consequences    Other: (copay*?-no  ) (fall risk*?- no  ) (ins visit limit*?- no  )     Precautions:  Precautions  STEADI Fall Risk Score (The score of 4 or more indicates an increased risk of  "falling): 0  Precautions Comment: none    Pain:  Pain Assessment  Pain Assessment: 0-10  0-10 (Numeric) Pain Score: 4 (max sx)  Pain Location: Knee  Pain Orientation: Left  Effect of Pain on Daily Activities: see goals    Prior Level of Function:  Prior Function Per Pt/Caregiver Report  Vocational: Full time employment (fire dept)    Objective     Outcome Measures:  Other Measures  Lower Extremity Funtional Score (LEFS): 70     Treatments:  SLS on airex 2x20\"  POC:  Ongoing clinic PT to include: continue with open to closed chain LLE PRE as jean-claude;  the patient does have access to bike  and free weight gym equip (will tailor ongoing HEP to available equip);  he was advised to not emphasize running primarily due to long term degenerative consequences  OP EDUCATION:  Access Code: [email or web address]  URL: [email or web address]  Date: 02/19/2025  Prepared by: Mook Herzog    Exercises  - Standing Single Leg Stance with Counter Support (Mirrored)     Goals:  Active       PT Problem       PT Goal 1       Start:  02/19/25    Expected End:  05/20/25       1. Independent HEP to allow for 50% reduction in max ADL C/C sx ( 4/10) 2-3wks  2. Survey score improvement from 70/80 to 75/80 (LEFS) 3-4wks  3. .Strength increase to allow for improved ADL stairs/ladders (from 4+/5 to 5/5 L knee flex/ext) 3-4wks           PT Goal 2       Start:  02/19/25    Expected End:  05/20/25       1. \"I want my  knee  to feel better\".              KNEE                    Knee AROM WFL unless documented below  Knee AROM WFL: yes  Lower Extremity Strength:WFL unless documented  MMT 5/5 max  RIGHT LEFT   Hip Flexion     5/5     5/5   Hip Extension     5/5     5/5   Hip Abduction     5/5    5 /5        Knee Extension    5 /5    4+ /5   Knee Flexion    5 /5    4+ /5                                    "

## 2025-02-21 ENCOUNTER — APPOINTMENT (OUTPATIENT)
Dept: PRIMARY CARE | Facility: CLINIC | Age: 30
End: 2025-02-21
Payer: OTHER GOVERNMENT

## 2025-02-25 ENCOUNTER — TREATMENT (OUTPATIENT)
Dept: PHYSICAL THERAPY | Facility: CLINIC | Age: 30
End: 2025-02-25
Payer: OTHER GOVERNMENT

## 2025-02-25 DIAGNOSIS — M25.562 CHRONIC PAIN OF LEFT KNEE: ICD-10-CM

## 2025-02-25 DIAGNOSIS — G89.29 CHRONIC PAIN OF LEFT KNEE: ICD-10-CM

## 2025-02-25 PROCEDURE — 97110 THERAPEUTIC EXERCISES: CPT | Mod: GP,CQ

## 2025-02-25 ASSESSMENT — PAIN SCALES - GENERAL: PAINLEVEL_OUTOF10: 1

## 2025-02-25 ASSESSMENT — PAIN - FUNCTIONAL ASSESSMENT: PAIN_FUNCTIONAL_ASSESSMENT: 0-10

## 2025-02-25 NOTE — PROGRESS NOTES
"Physical Therapy Treatment    Patient Name: Jonatan Horn  MRN: [id number]  Today's Date: 2/25/2025  Time Calculation  Start Time: 0826  Stop Time: 0905  Time Calculation (min): 39 min  PT Therapeutic Procedures Time Entry  Therapeutic Exercise Time Entry: 38         Current Problem  Problem List Items Addressed This Visit             ICD-10-CM    Left knee pain M25.562       Subjective   Pt.'s name and birthday confirmed. Pt. Has no c/o sx.'s at rest.  Increased discomfort noted with climbing stairs.        Precautions  Precautions  Precautions Comment: none      Pain  Pain Assessment: 0-10  0-10 (Numeric) Pain Score: 1  Pain Location: Knee  Pain Orientation: Left    Objective:  Treatments:  Stepper lv 1 x5 mins (N)  Slant board x2  60\" ea (N)  Step ups 6\" 2 x10 L (N)  Seated LAQ's 2 x10 (N)  Seated HS curls green band 2 x10 (N)  Seated ball squeezes 2 x10 3\" hold (N)  Seated hip abduction mint green band 2 x10 (N)  Supine SLR's L 2 x10 (N)  S/L hip abd L 2 x10 (N)  Supine quad sets L 2 x10 (N)  SLS on airex 3 x25\"    Mini squats (A)  BOSU lunges (A)        Ongoing clinic PT to include: continue with open to closed chain LLE PRE as jean-claude;       OP EDUCATION:  Access Code: [email or web address]  URL: [email or web address]  Date: 02/25/2025  Prepared by: Joan Leigh    Exercises  - Step Up  - 1 x daily - 7 x weekly - 2 sets - 10 reps  - Slant Board Gastrocnemius Stretch  - 1 x daily - 7 x weekly - 1 sets - 2 reps - 60\" hold  - Seated Long Arc Quad  - 1 x daily - 7 x weekly - 2 sets - 10 reps  - Seated Hamstring Curl with Anchored Resistance  - 1 x daily - 7 x weekly - 2 sets - 10 reps  - Seated Hip Adduction Isometrics with Ball  - 1 x daily - 7 x weekly - 2 sets - 10 reps  - Seated Hip Abduction with Resistance  - 1 x daily - 7 x weekly - 2 sets - 10 reps  - Supine Straight Leg Raises  - 1 x daily - 7 x weekly - 2 sets - 10 reps  - Sidelying Hip Abduction  - 1 x daily - 7 x weekly - 2 sets - 10 reps  - " "Supine Quad Set  - 1 x daily - 7 x weekly - 2 sets - 10 reps      Access Code: [email or web address]  URL: [email or web address]  Date: 02/19/2025  Prepared by: Mook Herzog     Exercises  - Standing Single Leg Stance with Counter Support (Mirrored)            Assessment:  Good tolerance noted with TE with no c/o increased sx's. Noted.  Pt. Does have a spot of pain/discomfort with his knee when he squats down at a certain point.  Fair balance reactions noted with SLS.  Handout provided and reviewed with patient.         Plan:  Continue with strengthening, ROM and flexibility per tolerance to improve climbing stairs/work duties with little to no difficulty.  MB       OP PT Plan  Treatment/Interventions: Aquatic therapy, Cryotherapy, Dry needling, Education/ Instruction, Electrical stimulation, Gait training, Hot pack, Manual therapy, Self care/ home management, Therapeutic exercises (IASTM/cupping)  PT Plan: Skilled PT  PT Frequency: 2 times per week  Duration: 4wks  Plan of Care Agreement: Patient              Goals:  Active       PT Problem       PT Goal 1       Start:  02/19/25    Expected End:  05/20/25       1. Independent HEP to allow for 50% reduction in max ADL C/C sx ( 4/10) 2-3wks  2. Survey score improvement from 70/80 to 75/80 (LEFS) 3-4wks  3. .Strength increase to allow for improved ADL stairs/ladders (from 4+/5 to 5/5 L knee flex/ext) 3-4wks           PT Goal 2       Start:  02/19/25    Expected End:  05/20/25       1. \"I want my  knee  to feel better\".             "

## 2025-02-27 ENCOUNTER — APPOINTMENT (OUTPATIENT)
Dept: PHYSICAL THERAPY | Facility: CLINIC | Age: 30
End: 2025-02-27
Payer: OTHER GOVERNMENT

## 2025-03-04 ENCOUNTER — TREATMENT (OUTPATIENT)
Dept: PHYSICAL THERAPY | Facility: CLINIC | Age: 30
End: 2025-03-04
Payer: OTHER GOVERNMENT

## 2025-03-04 DIAGNOSIS — M25.562 CHRONIC PAIN OF LEFT KNEE: ICD-10-CM

## 2025-03-04 DIAGNOSIS — G89.29 CHRONIC PAIN OF LEFT KNEE: ICD-10-CM

## 2025-03-04 PROCEDURE — 97110 THERAPEUTIC EXERCISES: CPT | Mod: GP,CQ

## 2025-03-04 ASSESSMENT — PAIN SCALES - GENERAL: PAINLEVEL_OUTOF10: 1

## 2025-03-04 ASSESSMENT — PAIN - FUNCTIONAL ASSESSMENT: PAIN_FUNCTIONAL_ASSESSMENT: 0-10

## 2025-03-04 NOTE — PROGRESS NOTES
"Physical Therapy Treatment    Patient Name: Jonatan Horn  MRN: [id number]  Today's Date: 3/4/2025  Time Calculation  Start Time: 0830  Stop Time: 0910  Time Calculation (min): 40 min  PT Therapeutic Procedures Time Entry  Therapeutic Exercise Time Entry: 38         Current Problem  Problem List Items Addressed This Visit             ICD-10-CM    Left knee pain M25.562       Subjective   Pt.'s name and birthday confirmed.  Pt. Is compliant with HEP.  Pt. With mild sx.'s with knee, about 1-2/10 pain.      Reason for Referral: Chronic L knee pain  Referred By: Omar Montana  General Comment: 3/9      Precautions  Precautions  Precautions Comment: none      Pain  Pain Assessment: 0-10  0-10 (Numeric) Pain Score: 1  Pain Location: Knee  Pain Orientation: Left    Objective:  One to no UE support needed with SLS.    Treatments:  Stepper lv 1 x6 mins   Slant board x2  60\" ea   Step ups 6\" 2 x10 L   Seated LAQ's 2 x10  Seated HS curls green band 2 x10   Seated ball squeezes 2 x10 3\" hold  Seated hip abduction mint green band 2 x10  Supine SLR's L 2 x10   S/L hip abd L 2 x10   Supine quad sets L 2 x10   SLS on airex 3 x25\"    Mini squats  2 x10 (N)  BOSU lunges 2 x10 (N)  Standing hip 3-ways x10 B (N)  Standing HR's x20 (N)           Ongoing clinic PT to include: continue with open to closed chain LLE PRE as jean-claude;            OP EDUCATION:  Access Code: [email or web address]  URL: [email or web address]  Date: 02/25/2025  Prepared by: Joan Leigh     Exercises  - Step Up  - 1 x daily - 7 x weekly - 2 sets - 10 reps  - Slant Board Gastrocnemius Stretch  - 1 x daily - 7 x weekly - 1 sets - 2 reps - 60\" hold  - Seated Long Arc Quad  - 1 x daily - 7 x weekly - 2 sets - 10 reps  - Seated Hamstring Curl with Anchored Resistance  - 1 x daily - 7 x weekly - 2 sets - 10 reps  - Seated Hip Adduction Isometrics with Ball  - 1 x daily - 7 x weekly - 2 sets - 10 reps  - Seated Hip Abduction with Resistance  - 1 x daily - 7 x " "weekly - 2 sets - 10 reps  - Supine Straight Leg Raises  - 1 x daily - 7 x weekly - 2 sets - 10 reps  - Sidelying Hip Abduction  - 1 x daily - 7 x weekly - 2 sets - 10 reps  - Supine Quad Set  - 1 x daily - 7 x weekly - 2 sets - 10 reps        Access Code: [email or web address]  URL: [email or web address]  Date: 02/19/2025  Prepared by: Mook Herzog     Exercises  - Standing Single Leg Stance with Counter Support (Mirrored)                Assessment:  Trial mini squats and lunges this date with slight discomfort noted with left knee.  Good tolerance with remaining exercises.  Good balance responses with SLS.       Plan:  Continue with strengthening, ROM and flexibility per tolerance to improve daily activities/work duties with little to no difficulty.  MB       OP PT Plan  Treatment/Interventions: Aquatic therapy, Cryotherapy, Dry needling, Education/ Instruction, Electrical stimulation, Gait training, Hot pack, Manual therapy, Self care/ home management, Therapeutic exercises (IASTM/cupping)  PT Plan: Skilled PT  PT Frequency: 2 times per week  Duration: 4wks  Plan of Care Agreement: Patient              Goals:  Active       PT Problem       PT Goal 1       Start:  02/19/25    Expected End:  05/20/25       1. Independent HEP to allow for 50% reduction in max ADL C/C sx ( 4/10) 2-3wks  2. Survey score improvement from 70/80 to 75/80 (LEFS) 3-4wks  3. .Strength increase to allow for improved ADL stairs/ladders (from 4+/5 to 5/5 L knee flex/ext) 3-4wks           PT Goal 2       Start:  02/19/25    Expected End:  05/20/25       1. \"I want my  knee  to feel better\".             "

## 2025-03-06 ENCOUNTER — TREATMENT (OUTPATIENT)
Dept: PHYSICAL THERAPY | Facility: CLINIC | Age: 30
End: 2025-03-06
Payer: OTHER GOVERNMENT

## 2025-03-06 DIAGNOSIS — M25.562 CHRONIC PAIN OF LEFT KNEE: ICD-10-CM

## 2025-03-06 DIAGNOSIS — G89.29 CHRONIC PAIN OF LEFT KNEE: ICD-10-CM

## 2025-03-06 PROCEDURE — 97110 THERAPEUTIC EXERCISES: CPT | Mod: GP

## 2025-03-06 ASSESSMENT — PAIN - FUNCTIONAL ASSESSMENT: PAIN_FUNCTIONAL_ASSESSMENT: 0-10

## 2025-03-06 ASSESSMENT — PAIN SCALES - GENERAL: PAINLEVEL_OUTOF10: 0 - NO PAIN

## 2025-03-06 NOTE — PROGRESS NOTES
"Physical Therapy Treatment    Patient Name: Jonatan Horn  MRN: [id number]  : 1995  Today's Date: 3/6/2025  Omar Montana  Time Calculation  Start Time: 08  Stop Time: 08  Time Calculation (min): 39 min      PT Therapeutic Procedures Time Entry  Therapeutic Exercise Time Entry: 38                         General:  General  Reason for Referral: Chronic L knee pain  Referred By: Omar Montana  General Comment:   Visit #4    Current Problem  Problem List Items Addressed This Visit             ICD-10-CM    Left knee pain M25.562         Assessment:Patient identity confirmed today with name/. ;  ( 0 ) falls since last clinic visit; modified/added to clinic/HEP today;       Plan:  Treatment/Interventions: Aquatic therapy, Cryotherapy, Dry needling, Education/ Instruction, Electrical stimulation, Gait training, Hot pack, Manual therapy, Self care/ home management, Therapeutic exercises (IASTM/cupping)  PT Plan: Skilled PT  PT Frequency: 2 times per week  Duration: 4wks  Plan of Care Agreement: Patient  Continue with clinic rehab treatments toward functional goals ( stairs); recheck in 2 wks    Subjective: I have  0 /10 pain right now.   The knee feels no different. I prefer to minimize clinic visits if I could.      Precautions  Precautions  Precautions Comment: none      Pain  Pain Assessment: 0-10  0-10 (Numeric) Pain Score: 0 - No pain  Pain Location: Knee  Pain Orientation: Left    Objective    Manual:__0___min    There ex:_38____min  PERSONAL HEP ed; A+P ed  Stepper lv 1 x6 mins   Slant board x2  60\" ea   Step ups 6\" 2 x10 L   Seated LAQ's 2 x10  Seated HS curls green band 2 x10   Seated ball squeezes 2 x10 3\" hold  Seated hip abduction mint green band 2 x10  Supine SLR's L 2 x10   S/L hip abd L 2 x10   Supine quad sets L 2 x10   SLS on airex 3 x25\"    Mini squats  2 x10 (N)  BOSU lunges 2 x10 (N)  Standing hip 3-ways x10 B (N)  Standing HR's x20 (N)    OP EDUCATION:                     HOME EXERCISE " "HINTS  AVOID HIGH RESISTANCE BIKING (STDG; [place])  AVOID WEIGHT LIFTING TWO CONSECUTIVE DAYS  KEEP LEAD KNEE BEHIND TOE DURING LUNGES (EQUAL WEIGHT BEARING)  LIGHTER WEIGHT; EMPHASIS ON FORM  SHELVE RUNNING FOR TIME BEING  ICE KNEE 20 MIN AS NEEDED  PERFORM PT EXERCISES ON WEIGHT LIFT DAYS  Access Code: [email or web address]  URL: [email or web address]  Date: 03/06/2025  Prepared by: Mook Herzog    Exercises  - Lateral Step Ups (Mirrored)   - Seated Hamstring Curl with Anchored Resistance (Mirrored)   - Seated Long Arc Quad (Mirrored)   - Standing Single Leg Stance with Counter Support (Mirrored)   - Standard Lunge     Goals:  Active       PT Problem       PT Goal 1       Start:  02/19/25    Expected End:  05/20/25       1. Independent HEP to allow for 50% reduction in max ADL C/C sx ( 4/10) 2-3wks  2. Survey score improvement from 70/80 to 75/80 (LEFS) 3-4wks  3. .Strength increase to allow for improved ADL stairs/ladders (from 4+/5 to 5/5 L knee flex/ext) 3-4wks           PT Goal 2       Start:  02/19/25    Expected End:  05/20/25       1. \"I want my  knee  to feel better\".             "

## 2025-03-10 ENCOUNTER — APPOINTMENT (OUTPATIENT)
Dept: PHYSICAL THERAPY | Facility: CLINIC | Age: 30
End: 2025-03-10
Payer: OTHER GOVERNMENT

## 2025-03-12 ENCOUNTER — APPOINTMENT (OUTPATIENT)
Dept: PHYSICAL THERAPY | Facility: CLINIC | Age: 30
End: 2025-03-12
Payer: OTHER GOVERNMENT

## 2025-03-18 ENCOUNTER — APPOINTMENT (OUTPATIENT)
Dept: PHYSICAL THERAPY | Facility: CLINIC | Age: 30
End: 2025-03-18
Payer: OTHER GOVERNMENT

## 2025-03-31 ENCOUNTER — APPOINTMENT (OUTPATIENT)
Dept: UROLOGY | Facility: CLINIC | Age: 30
End: 2025-03-31
Payer: OTHER GOVERNMENT

## 2025-04-23 ENCOUNTER — DOCUMENTATION (OUTPATIENT)
Dept: PHYSICAL THERAPY | Facility: CLINIC | Age: 30
End: 2025-04-23
Payer: OTHER GOVERNMENT

## 2025-04-23 NOTE — PROGRESS NOTES
Physical Therapy    Discharge Summary    Name: Jonatan Horn  MRN: [id number]  : 1995  Date: 25    Discharge Summary: PT       Has been seen in clinical physical therapy beginning on  for 3  visit (s); there has been no further visits attended. DC from PT

## 2025-05-27 ENCOUNTER — HOSPITAL ENCOUNTER (OUTPATIENT)
Age: 30
Discharge: HOME | End: 2025-05-27
Payer: SELF-PAY

## 2025-05-27 DIAGNOSIS — M99.03: Primary | ICD-10-CM

## 2025-05-27 PROCEDURE — 72110 X-RAY EXAM L-2 SPINE 4/>VWS: CPT

## 2025-08-26 ENCOUNTER — DOCUMENTATION (OUTPATIENT)
Dept: URGENT CARE | Age: 30
End: 2025-08-26

## 2025-08-26 DIAGNOSIS — R01.1 HEART MURMUR: Primary | ICD-10-CM

## 2026-02-13 ENCOUNTER — APPOINTMENT (OUTPATIENT)
Dept: PRIMARY CARE | Facility: CLINIC | Age: 31
End: 2026-02-13
Payer: OTHER GOVERNMENT